# Patient Record
Sex: MALE | Race: WHITE | NOT HISPANIC OR LATINO | URBAN - METROPOLITAN AREA
[De-identification: names, ages, dates, MRNs, and addresses within clinical notes are randomized per-mention and may not be internally consistent; named-entity substitution may affect disease eponyms.]

---

## 2024-04-08 ENCOUNTER — EMERGENCY (EMERGENCY)
Facility: HOSPITAL | Age: 39
LOS: 1 days | Discharge: HOME | End: 2024-04-08
Attending: STUDENT IN AN ORGANIZED HEALTH CARE EDUCATION/TRAINING PROGRAM
Payer: COMMERCIAL

## 2024-04-08 VITALS
WEIGHT: 198.42 LBS | RESPIRATION RATE: 20 BRPM | DIASTOLIC BLOOD PRESSURE: 86 MMHG | OXYGEN SATURATION: 98 % | SYSTOLIC BLOOD PRESSURE: 131 MMHG | TEMPERATURE: 98 F | HEIGHT: 73 IN | HEART RATE: 86 BPM

## 2024-04-08 PROCEDURE — 90471 IMMUNIZATION ADMIN: CPT

## 2024-04-08 PROCEDURE — 90715 TDAP VACCINE 7 YRS/> IM: CPT

## 2024-04-08 PROCEDURE — 99284 EMERGENCY DEPT VISIT MOD MDM: CPT | Mod: 25

## 2024-04-08 PROCEDURE — 99283 EMERGENCY DEPT VISIT LOW MDM: CPT | Mod: 25

## 2024-04-08 RX ORDER — TETANUS TOXOID, REDUCED DIPHTHERIA TOXOID AND ACELLULAR PERTUSSIS VACCINE, ADSORBED 5; 2.5; 8; 8; 2.5 [IU]/.5ML; [IU]/.5ML; UG/.5ML; UG/.5ML; UG/.5ML
0.5 SUSPENSION INTRAMUSCULAR ONCE
Refills: 0 | Status: COMPLETED | OUTPATIENT
Start: 2024-04-08 | End: 2024-04-08

## 2024-04-08 RX ADMIN — TETANUS TOXOID, REDUCED DIPHTHERIA TOXOID AND ACELLULAR PERTUSSIS VACCINE, ADSORBED 0.5 MILLILITER(S): 5; 2.5; 8; 8; 2.5 SUSPENSION INTRAMUSCULAR at 04:05

## 2024-04-08 NOTE — ED PROVIDER NOTE - OBJECTIVE STATEMENT
38-year-old male, no past medical history presenting  with head injury and abrasions.  Patient states that he was looking out the window, when the window mechanism failed and window came back down, hitting him to the back of the head.  Patient denying any headache, loss of consciousness, or neck pain.  Denies any weakness or numbness in the upper or lower extremities.  Has been able to ambulate without difficulty.   Patient hit his forehead against the ledge of the window and wanted to come to the ER for further evaluation.  No nausea, vomiting, fever or chills.

## 2024-04-08 NOTE — ED ADULT NURSE NOTE - OBJECTIVE STATEMENT
Pt presents with minor laceration to the forehead. Pt states he sliding window hit the back of his head unto the edge of the  open ing, Pt denies loss of consciousness. Pt has no pertinent pass medical history. Pt is alert , awake and oriented times three. Pt denies dizziness and headaches.

## 2024-04-08 NOTE — ED PROVIDER NOTE - PHYSICAL EXAMINATION
Gen: NAD; well appearing  Head: normocephalic  Eyes: EOMI, PERRLA, no conjunctival pallor, no scleral icterus  ENT: mucous membranes moist, no discharge  Neck: neck supple  Neuro: A&Ox4, sensation nl, motor 5/5 RUE/LUE/RLE/LLE, follows commands  Ext: no edema, no deformity, warm and well-perfused  Skin: +abrasion to mid-forehead between the eyebrows 1.0 cm and R lateral eyebrow 0.5 cm

## 2024-04-08 NOTE — ED PROVIDER NOTE - NSFOLLOWUPINSTRUCTIONS_ED_ALL_ED_FT
– Return for any worsening or concerning symptoms (see below).  – Follow up with primary care doctor in the next 5 to 7 days.     Abrasion    WHAT YOU NEED TO KNOW:  An abrasion is a scrape on your skin. It happens when your skin rubs against a rough surface. Some examples of an abrasion include rug burn, a skinned elbow, or road rash. Abrasions can be many shapes and sizes. The wound may hurt, bleed, bruise, or swell.     DISCHARGE INSTRUCTIONS:  Return to the emergency department if:   •The bleeding does not stop after 10 minutes of firm pressure.  •You cannot rinse one or more foreign objects out of your wound.  •You have red streaks on your skin coming from your wound.  Contact your healthcare provider if:   •You have a fever or chills.   •Your abrasion is red, warm, swollen, or draining pus.  •You have questions or concerns about your condition or care.  Care for your abrasion:   •Wash your hands and dry them with a clean towel.  •Press a clean cloth against your wound to stop any bleeding.  •Rinse your wound with a lot of clean water. Do not use harsh soap, alcohol, or iodine solutions.  •Use a clean, wet cloth to remove any objects, such as small pieces of rocks or dirt.  •Rub antibiotic ointment on your wound. This may help prevent infection and help your wound heal.  •Cover the wound with a non-stick bandage. Change the bandage daily, and if gets wet or dirty.   Follow up with your healthcare provider as directed: Write down your questions so you remember to ask them during your visits.

## 2024-04-08 NOTE — ED PROVIDER NOTE - CLINICAL SUMMARY MEDICAL DECISION MAKING FREE TEXT BOX
38-year-old male, no past medical history presenting  with head injury and abrasions. linear abrasions x2,  +1.0 cm abrasion between eyebrows and R lateral eyebrow 0.5 cm. No FBs appreciated. GCS 15,   No loss of consciousness, no nausea or vomiting.  Neuroexam within normal limits, cranial nervescranial nerves II through XII intact.  Patient ambulating independently with no issues.  Will give Tdap.  Abrasions extensively irrigated with normal saline, Dermabond gel placed to both abrasions.  Strict return precautions discussed.

## 2024-04-08 NOTE — ED PROVIDER NOTE - PATIENT PORTAL LINK FT
You can access the FollowMyHealth Patient Portal offered by [facility] by registering at the following website: [email or web address]. By joining [facility]’s FollowMyHealth portal, you will also be able to view your health information using other applications (apps) compatible with our system.

## 2024-04-16 ENCOUNTER — EMERGENCY (EMERGENCY)
Facility: HOSPITAL | Age: 39
LOS: 1 days | Discharge: HOME | End: 2024-04-16
Attending: STUDENT IN AN ORGANIZED HEALTH CARE EDUCATION/TRAINING PROGRAM
Payer: COMMERCIAL

## 2024-04-16 VITALS
WEIGHT: 198.42 LBS | TEMPERATURE: 98 F | DIASTOLIC BLOOD PRESSURE: 82 MMHG | OXYGEN SATURATION: 99 % | RESPIRATION RATE: 17 BRPM | HEIGHT: 73 IN | HEART RATE: 85 BPM | SYSTOLIC BLOOD PRESSURE: 145 MMHG

## 2024-04-16 PROBLEM — T07.XXXA UNSPECIFIED MULTIPLE INJURIES, INITIAL ENCOUNTER: Chronic | Status: ACTIVE | Noted: 2024-04-08

## 2024-04-16 PROCEDURE — 99283 EMERGENCY DEPT VISIT LOW MDM: CPT

## 2024-04-16 PROCEDURE — 99282 EMERGENCY DEPT VISIT SF MDM: CPT

## 2024-04-16 NOTE — ED PROVIDER NOTE - PATIENT PORTAL LINK FT
You can access the FollowMyHealth Patient Portal offered by [facility] by registering at the following website: [email or web address]. By joining [facility]’s FollowMyHealth portal, you will also be able to view your health information using other applications (apps) compatible with our system.

## 2024-04-16 NOTE — ED PROVIDER NOTE - OBJECTIVE STATEMENT
39YO M no pmhx, p/w h/a. onset 9d prior s/p trauma to the head - back of head hit with window, then fell forward onto a ledge with lac to the face. presented at that time, no CT performed. pt denies LOC at that time, although some amnesia for event. since then, endorses multiple episodes of intermittent blurry vision that have resolved on presentation today. endorses mild h/a, although improved/mild at this time. endorses difficulty w/ focusing. denies numbness/weakness/paresthesias to arms/legs, difficulty with ambulation, dizzinness, LOC episodes. pt ambulatory in ED w/o difficulty. arrives with dermabond on face, no bleeding.

## 2024-04-16 NOTE — ED ADULT TRIAGE NOTE - CHIEF COMPLAINT QUOTE
Headache ,blurry vision since window slamming on his head  on 4/7 24 ,was seen in ER that day however refused ct head at the time

## 2024-04-16 NOTE — ED PROVIDER NOTE - NSFOLLOWUPINSTRUCTIONS_ED_ALL_ED_FT
--take tylenol or motrin as needed for pain. Take tylenol 1000mg every 6 hours alternating with motrin 600 mg every 6 hours (take tylenol or motrin then three hours later take the other then three hours later take the first).  --your diagnosis is: concussion w/o coma.   --return to the ED if persistent neurological changes (blurry vision, confusion), if dizzinness, vomiting/nausea, if passing out, if headache becomes severe.

## 2024-04-16 NOTE — ED ADULT NURSE NOTE - CHIEF COMPLAINT
The patient is a 38y Male complaining of headache. Gentle Skin Care Counseling: I recommended use a gentle skin cleanser when washing the skin. I also recommended application of a moisturizer twice daily. Products with fragrances, preservatives and dyes should be avoided. Detail Level: Detailed

## 2024-04-16 NOTE — ED PROVIDER NOTE - CLINICAL SUMMARY MEDICAL DECISION MAKING FREE TEXT BOX
39YO M no pmhx, p/w h/a, intermittent blurry vision x days s/p trauma, sxs improved at this time. vss, pe nml neuro exam, + hemostatic abrasions to the face. suspect concussion, do not consider ICH given duration since event, pt with intermittent sxs that have improved on arrival to ED w/o any interventions, ambulatory w/o difficulty, well-appearing. no indication for CT at this time. Return precautions were provided to patient about need to return if neuro deficits become persistent, increasing severity of h/a.

## 2024-04-16 NOTE — ED PROVIDER NOTE - PHYSICAL EXAMINATION
Gen: WDWN, NAD  HEENT: EOMI, no nasal discharge, mucous membranes moist, no scalp hematoma.   CV: 2+ radial pulse R  Resp: no accessory muscle use, no increased work of breathing  MSK: no bruising, no LE edema. + hemostatic lac w/ dermabond to nasal bridge and R eyebrow. no midline c spine ttp.  Neuro: A&Ox3, following commands, moving all four extremities spontaneously. CN3-12 intact, EOMI. PERRLA, 5/5 strength in b/l UE/LE.  Sensation intact bl in UE/LE. Normal gait  Psych: appropriate mood